# Patient Record
Sex: MALE
[De-identification: names, ages, dates, MRNs, and addresses within clinical notes are randomized per-mention and may not be internally consistent; named-entity substitution may affect disease eponyms.]

---

## 2021-08-06 PROBLEM — Z00.00 ENCOUNTER FOR PREVENTIVE HEALTH EXAMINATION: Status: ACTIVE | Noted: 2021-08-06

## 2021-08-09 ENCOUNTER — APPOINTMENT (OUTPATIENT)
Dept: ORTHOPEDIC SURGERY | Facility: CLINIC | Age: 36
End: 2021-08-09
Payer: COMMERCIAL

## 2021-08-09 VITALS — WEIGHT: 200 LBS | HEIGHT: 66 IN | BODY MASS INDEX: 32.14 KG/M2

## 2021-08-09 DIAGNOSIS — M75.41 IMPINGEMENT SYNDROME OF RIGHT SHOULDER: ICD-10-CM

## 2021-08-09 PROCEDURE — 20611 DRAIN/INJ JOINT/BURSA W/US: CPT | Mod: RT

## 2021-08-09 PROCEDURE — 99204 OFFICE O/P NEW MOD 45 MIN: CPT | Mod: 25

## 2021-08-09 PROCEDURE — 76882 US LMTD JT/FCL EVL NVASC XTR: CPT | Mod: RT,59

## 2021-08-19 PROBLEM — M75.41 IMPINGEMENT SYNDROME OF RIGHT SHOULDER: Status: ACTIVE | Noted: 2021-08-19

## 2021-08-19 NOTE — DISCUSSION/SUMMARY
[de-identified] : EVALUATION AND MANAGEMENT \par \par 1- IMPINGEMENT SYNDROME  - NARROW SS OUTLET ON XRAY CONSISTENT WITH IMPINGEMENT SYNDROME \par PLAN -  HOME EXERCISES DEMONSTRATED AND PROVIDED, \par PROGRESS TO P.T. 2 X 4 WEEKS FOR SCAPULAR POSTURE, FLEXIBILITY AND REHAB \par \par 2 - ROTATOR CUFF TENDONITIS - TENDONITIS, BURSITIS, NO COMPLETE TEAR SEEN ON ULTRASOUND EVALUATION \par PLAN - KENALOG INJECTION  ( SEE PROCEDURE NOTE ) \par \par \par \par ULTRASOUND EVALUATION  REVEALS INFLAMMATORY CHANGES WITHOUT SIGNIFICANT TEAR \par PATIENT HAS ELECTED TO PROCEED WITH KENALOG INJECTION SHOULDER \par RISKS AND BENEFITS DISCUSSED - VERBAL CONSENT OBTAINED \par \par \par POST INJECTION INSTRUCTIONS\par \par COLD THERAPY , ANALGESICS PRN\par \par HOME STRETCHING AND EXERCISES QD  HANDOUT PROVIDED, REVIEWED AND DEMONSTRATED \par \par CONSIDER START P.T.  2 WEEKS AFTER INJECTION - 2 X 4 WEEKS \par \par MRI IF NO RELIEF\par

## 2021-08-19 NOTE — PHYSICAL EXAM
[de-identified] : PHYSICAL EXAM  RIGHT  SHOULDER\par \par MODERATE SCAPULAR PROTRACTION\par AROM 150 / 140 / 80 / 15\par TENDER: SA REGION / AC JOINT / GH JOINT / BICEPS GROOVE\par \par SPECIAL TESTING :\par HILLS - POSITIVE \par SUE - POSITIVE \par SPEED TEST - POSITIVE\par \par MCDOWELL - NEGATIVE \par APPREHENSION AND SUPPRESSION - NEGATIVE \par \par RC STRENGTH TESTING \par SS:  5/5\par SUB 5/5\par IS     5/5\par BICEPS  5/5\par \par SENSATION  - GROSSLY INTACT\par \par  \par

## 2021-08-19 NOTE — PROCEDURE
[de-identified] : DIAGNOSTIC ULTRASOUND RIGHT SHOULDER\par \par DIAGNOSTIC SONOGRAPHY of the Rotator Cuff Soft Tissue of the RIGHT SHOULDER was performed in Multiple Scan Planes with varying transducer frequencies.\par Imaging of the Supraspinatus Tendon reveals TENDONITIS, BURSITIS, ARTICULAR SIDE DEGENERATION  WITH OUT SIGNIFICANT / COMPLETE TEAR\par Imaging of the Biceps Tendon reveals no significant tear.\par Imaging of the Subscapularis Tendon reveals no significant tear.\par Imaging of the Infraspinatus Tendon reveals no significant tear.\par Key images were save digitally and reviewed with patient.\par \par \par INJECTION RIGHT SHOULDER SA SPACE\par \par Patient has demonstrated limited relief from NSAIDS, rest, exercises / PT, and after discussion of the risks and benefits, the patient has elected to proceed with an ULTRASOUND GUIDED injection into the RIGHT SUBACROMIAL  SPACE LATERAL APPROACH \par  \par Confirmed that the patient does not have history of prior adverse reactions, active, infections, or relevant allergies. There was no effusion, erythema, or warmth, and the skin was clear\par \par The skin was sterilized with alcohol. Ethyl Chloride was used as a topical anesthetic. Routine sterile technique. \par The site was injected UTILIZING ULTRASOUND GUIDANCE to confirm appropriate placement of the needle-\par with a mixture of medication and local anesthetic. The injection was completed without complication and a bandage was applied.\par  \par The patient tolerated the procedure well and was given post-injection instructions.Rec: Cold therapy, analgesics, avoid heavy activity.\par MEDICATION: 4cc of 1% xylocaine + 10mg of KENALOG\par \par

## 2021-08-19 NOTE — HISTORY OF PRESENT ILLNESS
[de-identified] : CHRONIC RIGHT SHOULDER PAIN \par PAIN BEGAN MAY 2021- PATIENT WAS PULLING UP A DUCT AT HIS JOB -FELT PAIN THE NEXT DAY \par PAIN LEVEL: 2/10 \par SHARP, THROBBING \par RADIATES DOWN ARM\par CONSTANT\par WORSE AT NIGHT, LYING DOWN, OVER HEAD LIFTING\par BETTER WITH ICE, TYLENOL, NAPROXEN \par PT VISITED KATHLEEN CRUZ ON JULY 31, 2021 -HAS X-RAY OF RIGHT SHOULDER \par

## 2021-11-15 ENCOUNTER — RX RENEWAL (OUTPATIENT)
Age: 36
End: 2021-11-15

## 2022-03-08 ENCOUNTER — RX RENEWAL (OUTPATIENT)
Age: 37
End: 2022-03-08

## 2022-03-08 RX ORDER — NAPROXEN 500 MG/1
500 TABLET ORAL TWICE DAILY
Qty: 60 | Refills: 2 | Status: ACTIVE | COMMUNITY
Start: 2021-08-18 | End: 1900-01-01

## 2023-01-11 ENCOUNTER — APPOINTMENT (OUTPATIENT)
Dept: ORTHOPEDIC SURGERY | Facility: CLINIC | Age: 38
End: 2023-01-11
Payer: COMMERCIAL

## 2023-01-11 DIAGNOSIS — M75.81 OTHER SHOULDER LESIONS, RIGHT SHOULDER: ICD-10-CM

## 2023-01-11 DIAGNOSIS — M19.012 PRIMARY OSTEOARTHRITIS, LEFT SHOULDER: ICD-10-CM

## 2023-01-11 DIAGNOSIS — M75.82 OTHER SHOULDER LESIONS, LEFT SHOULDER: ICD-10-CM

## 2023-01-11 DIAGNOSIS — M19.011 PRIMARY OSTEOARTHRITIS, RIGHT SHOULDER: ICD-10-CM

## 2023-01-11 PROCEDURE — 20611 DRAIN/INJ JOINT/BURSA W/US: CPT | Mod: 50

## 2023-01-11 PROCEDURE — 99214 OFFICE O/P EST MOD 30 MIN: CPT | Mod: 25

## 2023-01-11 PROCEDURE — 73030 X-RAY EXAM OF SHOULDER: CPT | Mod: 50

## 2023-01-11 NOTE — HISTORY OF PRESENT ILLNESS
[de-identified] : LOCATION:BILATERAL  SHOULDER \par DURATION: A FEW MONTHS AGO - NOW DOING HEAVIER WORK\par CONTEXT: ATRAUMATIC- NO SPECIFIC INJURY \par QUALITY: SHARP, \par  INTERMITTENT \par PAIN LEVEL: 7/10 \par BETTER WITH REST \par WORSE WITH OVER HEAD LIFTING, REACHING BEHIND  \par ASSOCIATED SYMPTOMS: LIMITED ROM \par PREVIOUS PHYSICAL THERPAY \par CORTIOSN INJECTION RIGHT SHOULDER- 08/09/21- HELPFUL \par \par

## 2023-01-11 NOTE — DISCUSSION/SUMMARY
[de-identified] : ULTRASOUND EVALUATION  REVEALS INFLAMMATORY CHANGES WITHOUT SIGNIFICANT TEAR \par PATIENT HAS ELECTED TO PROCEED WITH KENALOG INJECTION SHOULDER \par RISKS AND BENEFITS DISCUSSED - VERBAL CONSENT OBTAINED \par SEE PROCEDURE NOTE\par \par \par POST INJECTION INSTRUCTIONS:\par \par INJECTION THERAPY HANDOUT PROVIDED\par \par COLD THERAPY , ANALGESICS PRN\par \par HOME  EXERCISES QD - PENDULUM AND ROM  HANDOUT PROVIDED, REVIEWED AND DEMONSTRATED - REFERRED TO INSTRUCTIONAL VIDEO ON MY WEBSITE\par \par START P.T.  WITHIN 2 WEEKS AFTER INJECTION - 2 X 4 WEEKS \par \par MRI IF NO RELIEF 12 WEEKS\par

## 2023-01-11 NOTE — PROCEDURE
[de-identified] : INJECTION RIGHT AND LEFT SHOULDER SA SPACE\par \par Patient has demonstrated limited relief from NSAIDS, rest, exercises / PT, and after discussion of the risks and benefits, the patient has elected to proceed with an ULTRASOUND GUIDED injection into the RIGHT AND LEFT  SUBACROMIAL  SPACE LATERAL APPROACH \par  \par Confirmed that the patient does not have history of prior adverse reactions, active, infections, or relevant allergies. There was no effusion, erythema, or warmth, and the skin was clear\par \par The skin was sterilized with alcohol. Ethyl Chloride was used as a topical anesthetic. Routine sterile technique. \par The site was injected UTILIZING ULTRASOUND GUIDANCE to confirm appropriate placement of the needle-\par with a mixture of medication and local anesthetic. The injection was completed without complication and a bandage was applied.\par  \par The patient tolerated the procedure well and was given post-injection instructions.Rec: Cold therapy, analgesics, avoid heavy activity.\par MEDICATION: 4cc of 1% xylocaine + 40mg of KENALOG\par

## 2023-01-11 NOTE — PHYSICAL EXAM
[de-identified] : PHYSICAL EXAM LEFT AND RIGHT   SHOULDER\par \par NORMAL POSTURE / SCAPULAR PROTRACTION\par AROM \par LEFT  140 / 140 / 90 / 20\par RIGHT   140 / 140 / 90 / 30\par TENDER: SA REGION / AC JOINT / GH JOINT / BICEPS GROOVE\par \par SPECIAL TESTING :\par HILLS - POSITIVE \par SUE - POSITIVE \par SPEED TEST - POSITIVE\par \par MCDOWELL - NEGATIVE \par APPREHENSION AND SUPPRESSION - NEGATIVE \par \par RC STRENGTH TESTING \par SS:  5/5\par SUB 5/5\par IS     5/5\par BICEPS  5/5\par \par SENSATION  - GROSSLY INTACT\par \par \par  [de-identified] : RIGHT SHOULDER XRAY (2 VIEWS - AP AND OUTLET) -  \par NO OBVIOUS FRACTURE ,  SEPARATION OR DISLOCATION \par MILD  OSTEOARTHRITIS,\par TYPE 3B ACROMION \par CSA=35.1\par \par \par LEFT SHOULDER XRAY (2 VIEWS - AP AND OUTLET) -  \par NO OBVIOUS FRACTURE , SEPARATION OR DISLOCATION \par MILD- MODERATE  OSTEOARTHRITIS, \par TYPE 3B ACROMION \par CSA=31.7\par